# Patient Record
Sex: MALE | URBAN - METROPOLITAN AREA
[De-identification: names, ages, dates, MRNs, and addresses within clinical notes are randomized per-mention and may not be internally consistent; named-entity substitution may affect disease eponyms.]

---

## 2021-10-13 ENCOUNTER — OFFICE VISIT (OUTPATIENT)
Dept: URBAN - METROPOLITAN AREA CLINIC 6 | Facility: CLINIC | Age: 51
End: 2021-10-13
Payer: COMMERCIAL

## 2021-10-13 DIAGNOSIS — H25.813 COMBINED FORMS OF AGE-RELATED CATARACT, BILATERAL: ICD-10-CM

## 2021-10-13 DIAGNOSIS — E11.3513 DIABETES MELLITUS TYPE 2 WITH PROLIFERATIVE RETINOPATHY WITH MACULAR EDEMA, BILATERAL: Primary | ICD-10-CM

## 2021-10-13 PROCEDURE — 92134 CPTRZ OPH DX IMG PST SGM RTA: CPT | Performed by: OPHTHALMOLOGY

## 2021-10-13 PROCEDURE — 67028 INJECTION EYE DRUG: CPT | Performed by: OPHTHALMOLOGY

## 2021-10-13 PROCEDURE — 92014 COMPRE OPH EXAM EST PT 1/>: CPT | Performed by: OPHTHALMOLOGY

## 2021-10-13 ASSESSMENT — INTRAOCULAR PRESSURE
OD: 10
OS: 13

## 2021-10-13 NOTE — IMPRESSION/PLAN
Impression: Diabetes mellitus Type 2 with proliferative retinopathy with macular edema, bilateral: E81.7659. Plan: Proliferative diabetic retinopathy with clinically significant macular edema both eyes (O03.5878):  on moct today. Discussed the pathophysiology of diabetes and its effect on the eye. Stressed the importance of strong glucose control. Reviewed the relevant diabetic retinal studies with the patient. Explained the treatment options and the risks, benefits, and alternatives of them. We agreed on performing off label avastin injections and panretinal photocoagulation treatment in Right eye. Patient states all questions answered and wishes to proceed. Discussed that given the advancement of disease there is a high likelihood of progression despite our best efforts. AVASTIN injection given      eye today. Procedure: The patient was properly identified and the risks, benefits, and alternatives were fully discussed with the patient, including the off label use of this medication. All questions were answered, the patient decided to proceed and signed the informed consent. The surgical site was confirmed and a proparacaine drop was placed followed by iodine 5% topically on the eye. The periocular area was then prepped and a sterile eyelid speculum was used to open the lids. An additional 5% iodine drop was placed over the planned injection site. A small amount of subconjunctival lidocaine was placed and then the pars plana was marked 3.75mm posterior to the limbus with a sterile caliper. An additional 5% iodine drop was placed. 0.1mL of Avastin (2.5 mg per 0.1 mL) was injected through the pars plana. An additional drop of iodine was placed. The eyelid speculum was removed. It was verified that the patient could count fingers. Indirect biomicroscopy was performed to view the retina to assure no changes, specifically no holes, tears, or new vitreous hemorrhaging. The injection site was  inspected in the slit lamp and noted to be free of vitreous. Endophthalmitis and retinal detachment warnings were given.

## 2022-01-25 ENCOUNTER — OFFICE VISIT (OUTPATIENT)
Dept: URBAN - METROPOLITAN AREA CLINIC 6 | Facility: CLINIC | Age: 52
End: 2022-01-25
Payer: COMMERCIAL

## 2022-01-25 DIAGNOSIS — E11.3511 DIABETES MELLITUS TYPE 2 WITH PROLIFERATIVE RETINOPATHY WITH MACULAR EDEMA, RIGHT EYE: Primary | ICD-10-CM

## 2022-01-25 PROCEDURE — 92014 COMPRE OPH EXAM EST PT 1/>: CPT | Performed by: OPTOMETRIST

## 2022-01-25 NOTE — IMPRESSION/PLAN
Impression: ER for Diabetes mellitus Type 2 with proliferative retinopathy with macular edema, right eye: E11.3511. Plan: Diabetes Mellitus Type II with Proliferative Diabetic Retinopathy OD/moderate NPDR OS-VH OD today-advised to sleep with head elevation. Discussed the pathophysiology of diabetes and its effect on the eye. Stressed the importance of strong glucose control. Advised of importance of scheduled dilated examinations, and to contact us immediately for any problems or concerns. To Dr. Saud Dawson.

## 2022-03-30 ENCOUNTER — OFFICE VISIT (OUTPATIENT)
Dept: URBAN - METROPOLITAN AREA CLINIC 6 | Facility: CLINIC | Age: 52
End: 2022-03-30
Payer: COMMERCIAL

## 2022-03-30 PROCEDURE — 92014 COMPRE OPH EXAM EST PT 1/>: CPT | Performed by: OPHTHALMOLOGY

## 2022-03-30 PROCEDURE — 92134 CPTRZ OPH DX IMG PST SGM RTA: CPT | Performed by: OPHTHALMOLOGY

## 2022-03-30 ASSESSMENT — INTRAOCULAR PRESSURE
OD: 9
OS: 9

## 2022-03-30 NOTE — IMPRESSION/PLAN
Impression: Diabetes mellitus Type 2 with proliferative retinopathy with macular edema, right eye: E11.3511. Plan: Diabetes Mellitus Type II with Proliferative Diabetic Retinopathy OD/moderate NPDR OS-VH OD today-advised to sleep with head elevation. Discussed the pathophysiology of diabetes and its effect on the eye. Stressed the importance of strong glucose control. Advised of importance of scheduled dilated examinations, and to contact us immediately for any problems or concerns. Reviewed the relevant diabetic retinal studies with the patient. Explained the treatment options and the risks, benefits, and alternatives of them. We agreed on performing off label avastin (Bevacizumab) injections and panretinal photocoagulation treatment in Right eye then Left eye. Patient states all questions answered and wishes to proceed. Discussed that given the advancement of disease there is a high likelihood of progression despite our best efforts.

## 2022-06-29 ENCOUNTER — OFFICE VISIT (OUTPATIENT)
Dept: URBAN - METROPOLITAN AREA CLINIC 6 | Facility: CLINIC | Age: 52
End: 2022-06-29
Payer: COMMERCIAL

## 2022-06-29 DIAGNOSIS — H25.813 COMBINED FORMS OF AGE-RELATED CATARACT, BILATERAL: ICD-10-CM

## 2022-06-29 DIAGNOSIS — E11.3511 DIABETES MELLITUS TYPE 2 WITH PROLIFERATIVE RETINOPATHY WITH MACULAR EDEMA, RIGHT EYE: Primary | ICD-10-CM

## 2022-06-29 PROCEDURE — 92014 COMPRE OPH EXAM EST PT 1/>: CPT | Performed by: OPHTHALMOLOGY

## 2022-06-29 PROCEDURE — 92134 CPTRZ OPH DX IMG PST SGM RTA: CPT | Performed by: OPHTHALMOLOGY

## 2022-06-29 ASSESSMENT — INTRAOCULAR PRESSURE
OD: 9
OS: 12

## 2022-06-29 NOTE — IMPRESSION/PLAN
Impression: Diabetes mellitus Type 2 with proliferative retinopathy with macular edema, right eye: E11.3511. Plan: Diabetes Mellitus Type II with Proliferative Diabetic Retinopathy and Diabetic Macular Edema OD- Discussed the pathophysiology of diabetes and its effect on the eye. Stressed the importance of strong glucose control. Advised of importance of scheduled dilated examinations, and to contact us immediately for any problems or concerns. Patient was referred to a retina specialist for further evaluation and management. WILL REFER TO A RETINA SPECIALIST BECAUSE I WILL BE OUT OF THE OFFICE FOR AN EXTENDED PERIOD OF TIME.

## 2022-08-10 ENCOUNTER — OFFICE VISIT (OUTPATIENT)
Dept: URBAN - METROPOLITAN AREA CLINIC 6 | Facility: CLINIC | Age: 52
End: 2022-08-10
Payer: COMMERCIAL

## 2022-08-10 DIAGNOSIS — E11.3513 DIABETES MELLITUS TYPE 2 WITH PROLIFERATIVE RETINOPATHY WITH MACULAR EDEMA, BILATERAL: Primary | ICD-10-CM

## 2022-08-10 DIAGNOSIS — H25.813 COMBINED FORMS OF AGE-RELATED CATARACT, BILATERAL: ICD-10-CM

## 2022-08-10 PROCEDURE — 92134 CPTRZ OPH DX IMG PST SGM RTA: CPT | Performed by: OPHTHALMOLOGY

## 2022-08-10 PROCEDURE — 67028 INJECTION EYE DRUG: CPT | Performed by: OPHTHALMOLOGY

## 2022-08-10 PROCEDURE — 92014 COMPRE OPH EXAM EST PT 1/>: CPT | Performed by: OPHTHALMOLOGY

## 2022-08-10 ASSESSMENT — INTRAOCULAR PRESSURE
OD: 9
OS: 9

## 2022-08-10 NOTE — IMPRESSION/PLAN
Impression: Diabetes mellitus Type 2 with proliferative retinopathy with macular edema, bilateral: Z11.9139. Plan: Proliferative diabetic retinopathy with clinically significant macular edema with VH OD:  on moct today. Discussed the pathophysiology of diabetes and its effect on the eye. Stressed the importance of strong glucose control. Reviewed the relevant diabetic retinal studies with the patient. Explained the treatment options and the risks, benefits, and alternatives of them. We agreed on performing off label avastin (Bevacizumab) injections. Patient states all questions answered and wishes to proceed. Discussed that given the advancement of disease there is a high likelihood of progression despite our best efforts. AVASTIN (Bevacizumab) injection given in right eye today. Procedure: The patient was properly identified and the risks, benefits, and alternatives were fully discussed with the patient, including the off-label use of this medication. All questions were answered, the patient decided to proceed and signed the informed consent. The surgical site was confirmed and a proparacaine drop was placed followed by antibiotic drop of ofloxacin and povidone iodine 5% topically on the eye. A pledget soaked in 4% lidocaine was introduced in the lower fornix for additional anesthesia for a period of 10 minutes. The initial set of drops was repeated prior to proceeding to the injection. 0.08 mL of Avastin (Bevacizumab 2.0 mg per 0.08 mL) was injected through the pars plana. The patient tolerated the procedure well and was able to count fingers. Indirect biomicroscopy was performed to view the retina to assure no changes, specifically no holes, tears, or new vitreous hemorrhaging. The injection site was inspected in the slit lamp and noted to be free of vitreous. Endophthalmitis and retinal detachment warnings were given.

## 2022-12-28 ENCOUNTER — OFFICE VISIT (OUTPATIENT)
Dept: URBAN - METROPOLITAN AREA CLINIC 6 | Facility: CLINIC | Age: 52
End: 2022-12-28
Payer: COMMERCIAL

## 2022-12-28 DIAGNOSIS — E11.3513 DIABETES MELLITUS TYPE 2 WITH PROLIFERATIVE RETINOPATHY WITH MACULAR EDEMA, BILATERAL: Primary | ICD-10-CM

## 2022-12-28 DIAGNOSIS — H25.813 COMBINED FORMS OF AGE-RELATED CATARACT, BILATERAL: ICD-10-CM

## 2022-12-28 PROCEDURE — 92134 CPTRZ OPH DX IMG PST SGM RTA: CPT | Performed by: OPHTHALMOLOGY

## 2022-12-28 PROCEDURE — 92014 COMPRE OPH EXAM EST PT 1/>: CPT | Performed by: OPHTHALMOLOGY

## 2022-12-28 ASSESSMENT — INTRAOCULAR PRESSURE
OD: 10
OS: 12

## 2022-12-28 NOTE — IMPRESSION/PLAN
Impression: Diabetes mellitus Type 2 with proliferative retinopathy with macular edema, bilateral: C51.6259. Plan: Regressed Proliferative diabetic retinopathy with resolved clinically significant macular edema OD:  on moct ordered today. Discussed the pathophysiology of diabetes and its effect on the eye. Stressed the importance of strong glucose control. Reviewed the relevant diabetic retinal studies with the patient. Defer Avastin today. Might consider PRP in the near future. NVD inactive/ No NV seen on exam today. A1c 5.

## 2023-11-08 ENCOUNTER — OFFICE VISIT (OUTPATIENT)
Dept: URBAN - METROPOLITAN AREA CLINIC 1 | Facility: CLINIC | Age: 53
End: 2023-11-08
Payer: COMMERCIAL

## 2023-11-08 DIAGNOSIS — H25.13 AGE-RELATED NUCLEAR CATARACT, BILATERAL: ICD-10-CM

## 2023-11-08 DIAGNOSIS — H43.11 VITREOUS HEMORRHAGE, RIGHT EYE: ICD-10-CM

## 2023-11-08 DIAGNOSIS — E11.3592 DIABETES MELLITUS TYPE 2 WITH PROLIFERATIVE RETINOPATHY WITHOUT MACULAR EDEMA, LEFT EYE: ICD-10-CM

## 2023-11-08 DIAGNOSIS — E11.3511 DIABETES MELLITUS TYPE 2 WITH PROLIFERATIVE RETINOPATHY WITH MACULAR EDEMA, RIGHT EYE: Primary | ICD-10-CM

## 2023-11-08 PROCEDURE — 99213 OFFICE O/P EST LOW 20 MIN: CPT

## 2023-11-08 ASSESSMENT — INTRAOCULAR PRESSURE
OD: 15
OS: 18

## 2024-02-08 ENCOUNTER — Encounter (OUTPATIENT)
Dept: URBAN - METROPOLITAN AREA CLINIC 1 | Facility: CLINIC | Age: 54
End: 2024-02-08